# Patient Record
Sex: FEMALE | Race: BLACK OR AFRICAN AMERICAN | NOT HISPANIC OR LATINO
[De-identification: names, ages, dates, MRNs, and addresses within clinical notes are randomized per-mention and may not be internally consistent; named-entity substitution may affect disease eponyms.]

---

## 2019-11-19 PROBLEM — Z00.00 ENCOUNTER FOR PREVENTIVE HEALTH EXAMINATION: Status: ACTIVE | Noted: 2019-11-19

## 2019-11-20 ENCOUNTER — APPOINTMENT (OUTPATIENT)
Dept: ORTHOPEDIC SURGERY | Facility: CLINIC | Age: 54
End: 2019-11-20
Payer: COMMERCIAL

## 2019-11-20 ENCOUNTER — LABORATORY RESULT (OUTPATIENT)
Age: 54
End: 2019-11-20

## 2019-11-20 VITALS
HEIGHT: 64 IN | WEIGHT: 155 LBS | OXYGEN SATURATION: 100 % | SYSTOLIC BLOOD PRESSURE: 150 MMHG | HEART RATE: 62 BPM | DIASTOLIC BLOOD PRESSURE: 83 MMHG | BODY MASS INDEX: 26.46 KG/M2

## 2019-11-20 DIAGNOSIS — M25.462 EFFUSION, LEFT KNEE: ICD-10-CM

## 2019-11-20 DIAGNOSIS — Z87.09 PERSONAL HISTORY OF OTHER DISEASES OF THE RESPIRATORY SYSTEM: ICD-10-CM

## 2019-11-20 DIAGNOSIS — Z78.9 OTHER SPECIFIED HEALTH STATUS: ICD-10-CM

## 2019-11-20 DIAGNOSIS — M25.562 PAIN IN LEFT KNEE: ICD-10-CM

## 2019-11-20 PROCEDURE — 99204 OFFICE O/P NEW MOD 45 MIN: CPT | Mod: 25

## 2019-11-20 PROCEDURE — 73562 X-RAY EXAM OF KNEE 3: CPT | Mod: LT

## 2019-11-20 PROCEDURE — 20610 DRAIN/INJ JOINT/BURSA W/O US: CPT | Mod: LT

## 2019-11-20 NOTE — DISCUSSION/SUMMARY
[de-identified] : Discussed findings of today's exam and possible causes of patient's pain.  Educated patient on their most probable diagnosis of acute left knee pain status post twisting mechanism injury 3 days ago, localized medial joint line tenderness with associated effusion.  Reviewed possible courses of treatment, and we collaboratively decided best course of treatment at this time will include aspiration today (see procedure note).  The aspirated fluid from the knee today was yellow and serous, with lack of bloody effusion doubt acute internal derangement, likely exacerbation of underlying osteoarthritis/chondrosis.  Patient started on a course of oral NSAIDs, prescription given for Mobic (We discussed all possible side effects of this medication).  Patient will be started on a course of physical therapy to restore normal range of motion and strength as tolerated. Patient advised a pickup over-the-counter knee compression sleeve to be worn during the day for support and to prevent refill of fluid. If patient has persisting pain would recommend an MRI to evaluate for internal derangement. If patient has persisting pain and no surgical indications found on MRI would recommend cortisone injection at that time.  Follow up as needed.  Patient appreciates and agrees with current plan.\par \par This note was generated using dragon medical dictation software.  A reasonable effort has been made for proofreading its contents, but typos may still remain.  If there are any questions or points of clarification needed please notify my office.

## 2019-11-20 NOTE — HISTORY OF PRESENT ILLNESS
[de-identified] : Patient is here for left knee pain that began on 11/17/19 when she was walking down stairs. She states that her foot turned and caused her knee to twist. She has used Ibuprofen to treat the pain along with a Azeem's rub. She had a prior injury where she hit her knee with a beach chair in June but had a full recovery. Denies N/T/R/Prior injury/locking/buckling. \par \par The patient's past medical history, past surgical history, medications and allergies were reviewed by me today and documented accordingly. In addition, the patient's family and social history, which were noncontributory to this visit, were reviewed also. The patient has no family history of arthritis.

## 2019-11-20 NOTE — RETURN TO WORK/SCHOOL
[FreeTextEntry1] : Meg was seen today for evaluation of left knee pain, she underwent a procedure in the office today. She is permitted to return to regular work duties on Monday 11/25/19.\par Thank you for your understanding.\par \par Sincerely,\par \par Yong Romero DO, ATC\par Primary Care Sports Medicine\par [facility] [place]\par

## 2019-11-20 NOTE — PHYSICAL EXAM
[de-identified] : Constitutional: Well-nourished, well-developed, No acute distress\par Respiratory:  Good respiratory effort, no SOB\par Lymphatic: No regional lymphadenopathy, no lymphedema\par Psychiatric: Pleasant and normal affect, alert and oriented x3\par Skin: Clean dry and intact B/L UE/LE\par Musculoskeletal: normal except where as noted in regional exam\par \par \par Right Knee:\par APPEARANCE: no marked deformities, no swelling or malalignment\par POSITIVE TENDERNESS:  none\par NONTENDER: jt lines b/l & retinacula b/l, patellar & quadriceps tendons, MCL/LCL, ITB at the lateral femoral condyle & Gerdy's tubercle, pes bursa. \par ROM: full & painless. \par RESISTIVE TESTING: painless resisted knee flex/ext. \par SPECIAL TESTS: stable v/v stress. painless grind. neg Lachman's. neg ant/post drawer. neg Radha's. neg Thessaly test. neg Lillian's & Malacrae's\par NEURO: Normal sensation of LE, DTRs 2+/4 patella and achilles\par PULSES: 2+ DP/PT pulses\par B/L Hips: No asymmetry, malalignment, or swelling, Full ROM, 5/5 strength in flexion/ext, IR/ER, Abd/Add, Joints stable\par B/L Ankles: No asymmetry, malalignment, or swelling, Full ROM, 5/5 strength in DF/PF/Inv/Ev, Joints stable\par BIOMECHANICAL EXAM: no marked leg length discrepancy, mild hip abductor weakness b/l, no marked foot pronation, tight hams and ITB b/l.  Normal gait and station\par \par Left Knee:\par APPEARANCE: 2+ swelling, no marked deformities or malalignment\par POSITIVE TENDERNESS:  tender medial joint line. \par NONTENDER: lateral jt line & retinacula b/l, patellar & quadriceps tendons, MCL/LCL, ITB at the lateral femoral condyle & Gerdy's tubercle, pes bursa. \par ROM: mild pain & restriction in flexion. \par RESISTIVE TESTING: painless resisted knee flex/ext. \par SPECIAL TESTS: Radha's reproduces pain in the medial joint line. Thessaly test reproduces pain in the medial joint line. stable v/v stress. painless grind. neg Lachman's. neg ant/post drawer. neg Lillian's & Malacrae's\par NEURO: Normal sensation of LE, DTRs 2+/4 patella and achilles\par PULSES: 2+ DP/PT pulses\par \par \par  [de-identified] : The following radiographs were ordered and read by me during this patient's visit. I reviewed each radiograph in detail with the patient and discussed the findings as highlighted below. \par \par 3 views of the left knee were obtained today that show degenerative changes and evidence of mild tricompartmental osteoarthritis with medial joint line narrowing.  No fracture, or dislocation seen at this time. There is no malalignment. No other obvious osseous abnormality. Otherwise unremarkable.\par \par

## 2019-11-20 NOTE — PROCEDURE
[de-identified] : Aspiration: left knee joint.\par Indication: Effusion.\par \par A discussion was had with the patient regarding this procedure and all questions were answered. All risks, benefits and alternatives were discussed. These included but were not limited to bleeding, infection, allergic reaction and reaccumulation of fluid. A timeout was done to ensure correct side and pt agreed to the procedure. Betadine was used to sterilize and prep the area, and alcohol was used to clean the skin in the supero-lateral aspect of the knee. Ethyl chloride spray was then used as a topical anesthetic. An 18-gauge needle was used to aspirate the knee joint from the superolateral approach and approximately 37cc of serosanguineous fluid was aspirated from the knee without complication. A sterile bandage was then applied. The patient tolerated the procedure well.\par

## 2019-11-25 LAB
B PERT IGG+IGM PNL SER: ABNORMAL
COLOR FLD: YELLOW
EOSINOPHIL # FLD MANUAL: 0 %
FLUID INTAKE SUBSTANCE CLASS: NORMAL
LYMPHOCYTES # FLD MANUAL: 1 %
MESOTHL CELL NFR FLD: 0 %
MONOS+MACROS NFR FLD MANUAL: 35 %
NEUTS SEG # FLD MANUAL: 64 %
NRBC # FLD: 0
RBC # FLD MANUAL: 1000 /UL
SYCRY CLARITY: ABNORMAL
SYCRY COLOR: YELLOW
SYCRY ID: NORMAL
SYCRY TUBE: NORMAL
TOTAL CELLS COUNTED FLD: 143 /UL
TUBE TYPE: NORMAL
UNIDENT CELLS NFR FLD MANUAL: 0 %
VARIANT LYMPHS # FLD MANUAL: 0 %

## 2019-12-09 LAB — BACTERIA FLD CULT: NORMAL

## 2019-12-16 ENCOUNTER — RX RENEWAL (OUTPATIENT)
Age: 54
End: 2019-12-16

## 2020-01-13 ENCOUNTER — RX RENEWAL (OUTPATIENT)
Age: 55
End: 2020-01-13

## 2020-01-13 RX ORDER — MELOXICAM 7.5 MG/1
7.5 TABLET ORAL
Qty: 30 | Refills: 0 | Status: ACTIVE | COMMUNITY
Start: 2019-11-20 | End: 1900-01-01

## 2020-02-14 ENCOUNTER — RX RENEWAL (OUTPATIENT)
Age: 55
End: 2020-02-14

## 2021-10-30 ENCOUNTER — EMERGENCY (EMERGENCY)
Facility: HOSPITAL | Age: 56
LOS: 1 days | Discharge: HOME | End: 2021-10-30
Attending: STUDENT IN AN ORGANIZED HEALTH CARE EDUCATION/TRAINING PROGRAM
Payer: SELF-PAY

## 2021-10-30 VITALS
OXYGEN SATURATION: 100 % | HEIGHT: 64 IN | WEIGHT: 164.02 LBS | TEMPERATURE: 99 F | HEART RATE: 64 BPM | SYSTOLIC BLOOD PRESSURE: 173 MMHG | RESPIRATION RATE: 18 BRPM | DIASTOLIC BLOOD PRESSURE: 92 MMHG

## 2021-10-30 PROCEDURE — 99283 EMERGENCY DEPT VISIT LOW MDM: CPT

## 2021-10-30 PROCEDURE — 99282 EMERGENCY DEPT VISIT SF MDM: CPT

## 2021-10-30 NOTE — ED STATDOCS - CLINICAL SUMMARY MEDICAL DECISION MAKING FREE TEXT BOX
Ring removed with lube and silk suture.  FROMI throughout digit s/p removal  DNVI  NSAIDs and RICE discussed with pt  Return precautions

## 2021-10-30 NOTE — ED STATDOCS - OBJECTIVE STATEMENT
56 F presents to ed c/o ring stuck on R 3rd digit. Put it on last night, woke up this morning with some distal swelling, unable to remove. States she hasn't worn this ring in years. Denies any other complaints.

## 2021-10-30 NOTE — ED STATDOCS - PATIENT PORTAL LINK FT
You can access the FollowMyHealth Patient Portal offered by [facility] by registering at the following website: [email or web address]. By joining [facility]’s FollowMyHealth portal, you will also be able to view your health information using other applications (apps) compatible with our system.

## 2021-10-30 NOTE — ED STATDOCS - ATTENDING CONTRIBUTION TO CARE
57yo female with ring stuck to the right 3rd digit. Pt denies fevers/chills, ha, loc, focal neuro deficits, cp/sob/palp, cough, abd pain/n/v/d, urinary symptoms, recent travel and sick contacts.  ring stuck on R 3rd digit. Mild distal swelling. No erythema. No pain with ROM.  ring removal

## 2022-07-05 ENCOUNTER — APPOINTMENT (OUTPATIENT)
Dept: ORTHOPEDIC SURGERY | Facility: CLINIC | Age: 57
End: 2022-07-05

## 2022-07-05 DIAGNOSIS — M17.11 UNILATERAL PRIMARY OSTEOARTHRITIS, RIGHT KNEE: ICD-10-CM

## 2022-07-05 PROCEDURE — 99214 OFFICE O/P EST MOD 30 MIN: CPT

## 2022-07-05 RX ORDER — MELOXICAM 7.5 MG/1
7.5 TABLET ORAL
Qty: 30 | Refills: 0 | Status: ACTIVE | COMMUNITY
Start: 2022-07-05 | End: 1900-01-01

## 2022-07-05 RX ORDER — MELOXICAM 15 MG/1
15 TABLET ORAL
Qty: 30 | Refills: 0 | Status: ACTIVE | COMMUNITY
Start: 2022-06-17

## 2022-07-05 NOTE — HISTORY OF PRESENT ILLNESS
[de-identified] : Patient is here for right knee pain that began about 2 months ago without inciting injury. The pain is intermittent and she i unable ot identify any triggers. She saw a different provider who sent her for an MRI and recommended surgery. She has done nothing to treat it during this time. Denies N/T/R/Prior injury. She works as a . She has continued to exercise during this time.

## 2022-07-05 NOTE — DISCUSSION/SUMMARY
[de-identified] : Discussed findings of today's exam and possible causes of patient's pain.  Educated patient on their most probable diagnosis of chronic intermittent right knee pain with recent exacerbation after performing [place] exercises.  Reviewed possible courses of treatment, and we collaboratively decided best course of treatment at this time will include conservative management.  Patient was seen by an outside orthopedic group, she already had MRI of her right knee which shows that she has degenerative radial tear of the posterior horn of the medial meniscus.  Patient was advised by that orthopedic surgeon that she should have surgery, patient is looking for a second opinion, she is hoping to avoid surgery if at all possible.  I reviewed the patient's prior MRI of the left knee from 2018, patient had a radial tear of the posterior horn of the medial meniscus on that MRI of the left knee at that time.  She was able to proceed with nonsurgical management and had resolution of her pain.  She still has intermittent left knee pain, but it does not limit her from doing any of her activities.  Patient is advised that she has no significant effusion right now, it is unlikely that this radial tear is acute in nature, this is likely a chronic finding and became exacerbated.  I advised the patient it seems beneficial to proceed with a course of nonsurgical treatment to see if we can alleviate this pain, if not it does not eliminate potential arthroscopic surgical intervention as a future treatment option.  Patient started on a course of oral NSAIDs, prescription given for Mobic (We discussed all possible side effects of this medication).  Patient will be started on a course of physical therapy to restore normal range of motion and strength as tolerated.  We discussed the role of a cortisone injection for short-term pain relief, patient would like to defer at this time.  Recommend follow-up in 1 month if patient is still having persisting pain.  Patient appreciates and agrees with current plan.\par \par I work as part of an academic orthopedic group and routinely have a physician in training (resident / fellow) working with me.  Any part of the history and physical exam performed by the physician in training was either directly reviewed and/or replicated by myself.  Any procedure performed by the physician in training was performed under my direct supervision and with the consent of the patient.\par \par This note was generated using dragon medical dictation software.  A reasonable effort has been made for proofreading its contents, but typos may still remain.  If there are any questions or points of clarification needed please notify my office.

## 2022-07-05 NOTE — RETURN TO WORK/SCHOOL
[FreeTextEntry1] : Meg was seen in the office today for evaluation and management of right knee orthopedic issue.\par Thank you for your understanding.\par \par Sincerely,\par \par Yong Romero DO, ATC\par Primary Care Sports Medicine\par [facility] [place]\par

## 2022-07-05 NOTE — PHYSICAL EXAM
[de-identified] : Constitutional: Well-nourished, well-developed, No acute distress\par Respiratory:  Good respiratory effort, no SOB\par Psychiatric: Pleasant and normal affect, alert and oriented x3\par Musculoskeletal: normal except where as noted in regional exam\par \par Right Knee:\par APPEARANCE: mild swelling, no marked deformities or malalignment\par POSITIVE TENDERNESS:  tender medial joint line. \par NONTENDER: lateral jt line & retinacula b/l, patellar & quadriceps tendons, MCL/LCL, ITB at the lateral femoral condyle & Gerdy's tubercle, pes bursa. \par ROM: mild pain & restriction in flexion. \par RESISTIVE TESTING: painless resisted knee flex/ext. \par SPECIAL TESTS: Radha's reproduces pain in the medial joint line. Thessaly test reproduces pain in the medial joint line. stable v/v stress. painless grind. neg Lachman's. neg ant/post drawer.\par  [de-identified] : I reviewed, interpreted and clinically correlated the following outside imaging studies,\par \par DATE OF EXAM: 04/29/2022\par EXAM: MRI-RIGHT KNEE NON CONTRAST\par HISTORY: M25.561 Right knee pain\par TECHNIQUE: Axial, coronal, and sagittal images of the right knee were obtained\par on a 3.0 Jennifer magnet.\par COMPARISON: None.\par FINDINGS:\par LIGAMENTS\par Anterior cruciate ligament: Intact.\par Posterior cruciate ligament: Intact.\par Medial collateral ligament: Intact.\par Lateral supporting structures: The lateral collateral ligament is intact. The\par biceps femoris tendon is intact. The iliotibial band is intact. The popliteus\par tendon is intact.\par Posterolateral corner structures: Intact.\par MENISCI\par Medial meniscus: Full-thickness radial tear at the posterior root ligament\par attachment. Fragmented corticated bony bodies at the posterior joint space\par superficial to the posterior cruciate ligament attachment may represent sequelae\par of prior trauma with internal high signal consistent with edema.\par Lateral meniscus: Intact.\par EXTENSOR MECHANISM\par Distal quadriceps: Intact.\par Patellar tendon: Intact.\par Patella: No subluxation or tilt.\par Medial Patellofemoral Ligament/Retinaculum: Intact.\par OSSEOUS AND ARTICULAR STRUCTURES\par Bones: No acute fracture or focal bony lesion. Intra-articular bony bodies as\par above with subchondral cystic changes involving the subjacent posterolateral\par medial tibial plateau.\par Patellofemoral compartment: Full-thickness cartilage loss involving the lateral\par trochlear groove with osteophyte formation and subchondral marrow edema.\par Medial compartment: No hyaline cartilage disease.\par Lateral compartment: High-grade partial thickness cartilage loss involving the\par anterior femoral condyle weightbearing cartilage. Tibial cartilage maintained.\par Page 2 of 2\par OTHER\par Fluid: Small joint effusion. Small popliteal cyst.\par Soft Tissues: No soft tissue mass or edema. Lobulated fluid signal posterior to\par the posteromedial joint capsule (series 5 image 22) compatible with a ganglion\par cyst that measures 16 mm in greatest CC dimension. Neurovascular structures\par demonstrate normal course.\par IMPRESSION:\par Radial tear at the posterior root ligament attachment of the medial meniscus.\par Full-thickness involving the lateral trochlear groove with osteophyte formation\par and subchondral marrow edema.\par High-grade partial thickness cartilage loss involving the anterior lateral\par femoral condyle.\par Corticated intra-articular bony bodies at the posterior joint space may\par represent sequelae of prior trauma versus calcified displaced cartilage\par fragment.

## 2022-07-21 NOTE — ED PROCEDURE NOTE - NS ED PROCEDURE ASSISTED BY
The procedure was performed independently Sski Pregnancy And Lactation Text: This medication is Pregnancy Category D and isn't considered safe during pregnancy. It is excreted in breast milk.

## 2025-03-10 ENCOUNTER — NON-APPOINTMENT (OUTPATIENT)
Age: 60
End: 2025-03-10

## 2025-03-10 ENCOUNTER — EMERGENCY (EMERGENCY)
Facility: HOSPITAL | Age: 60
LOS: 1 days | Discharge: HOME | End: 2025-03-10
Attending: EMERGENCY MEDICINE
Payer: COMMERCIAL

## 2025-03-10 VITALS
OXYGEN SATURATION: 98 % | TEMPERATURE: 98 F | SYSTOLIC BLOOD PRESSURE: 160 MMHG | DIASTOLIC BLOOD PRESSURE: 83 MMHG | WEIGHT: 139.99 LBS | HEART RATE: 87 BPM | RESPIRATION RATE: 16 BRPM

## 2025-03-10 PROCEDURE — 99285 EMERGENCY DEPT VISIT HI MDM: CPT

## 2025-03-10 RX ORDER — VANCOMYCIN HCL IN 5 % DEXTROSE 1.5G/250ML
1000 PLASTIC BAG, INJECTION (ML) INTRAVENOUS ONCE
Refills: 0 | Status: COMPLETED | OUTPATIENT
Start: 2025-03-10 | End: 2025-03-10

## 2025-03-10 RX ORDER — PIPERACILLIN-TAZO-DEXTROSE,ISO 3.375G/5
3.38 IV SOLUTION, PIGGYBACK PREMIX FROZEN(ML) INTRAVENOUS ONCE
Refills: 0 | Status: COMPLETED | OUTPATIENT
Start: 2025-03-10 | End: 2025-03-10

## 2025-03-10 RX ORDER — ACETAMINOPHEN 500 MG/5ML
1000 LIQUID (ML) ORAL ONCE
Refills: 0 | Status: COMPLETED | OUTPATIENT
Start: 2025-03-10 | End: 2025-03-10

## 2025-03-10 NOTE — ED PROVIDER NOTE - PROGRESS NOTE DETAILS
Sadia Rodriguez, DO: patient received at sign out pending surgery recs. patient evaluated by surgery, recs appreciated. rx abx to pharmacy and patient will f/u outpatient. stable for dc home.

## 2025-03-10 NOTE — ED PROVIDER NOTE - NSFOLLOWUPCLINICS_GEN_ALL_ED_FT
[facility] Acute Care Surgery  Acute Care Surgery  [address] [place] [id number]  Phone: [phone]  Fax:

## 2025-03-10 NOTE — ED ADULT NURSE NOTE - NS ED NURSE RECORD ANOTHER HT AND WT
The eustachian tube is a small canal that connects the middle ear to the back of the nose and upper throat. It serves as a mechanism to equalize air pressure in the middle ear with outside pressure. Eustachian tube dysfunction (ETD) occurs when the tube fails to open (stuck closed) during swallowing or yawning resulting in a difference between the air pressure inside and outside the middle ear. This condition, mostly commonly seen in young children, can cause ear pain and sometimes difficulty hearing.    CAUSES OF EUSTACHIAN TUBE DYSFUNCTION  The most common cause is an infection of the nose such as a cold or sinusitis  Enlarged adenoids and tonsils, especially in children  Allergies  Smoking and pollution  Nose polyps or nasal tumors  Cleft palate    SYMPTOMS OF EUSTACHIAN TUBE DYSFUNCTION  Pulling or tugging on the ear (for young children especially)  Discomfort or pain in the ear  Ears feel full or clogged  Ringing or popping noises in the ears  Hearing loss  Dizziness or a sensation of spinning known as vertigo  Symptoms that cannot be relieved by swallowing, yawning, or chewing    MEDICAL TREATMENT FOR EUSTACHIAN TUBE DYSFUNCTION  Nasal or oral decongestants  Oral antihistamines  Nasal steroids to relieve nasal congestion and enable the eustachian tube to open  Pain medications such as acetaminophen or ibuprofen    Yes

## 2025-03-10 NOTE — ED ADULT NURSE NOTE - NSFALLUNIVINTERV_ED_ALL_ED
no Bed/Stretcher in lowest position, wheels locked, appropriate side rails in place/Call bell, personal items and telephone in reach/Instruct patient to call for assistance before getting out of bed/chair/stretcher/Non-slip footwear applied when patient is off stretcher/[place] to call system/Physically safe environment - no spills, clutter or unnecessary equipment/Purposeful proactive rounding/Room/bathroom lighting operational, light cord in reach

## 2025-03-10 NOTE — ED PROVIDER NOTE - NSFOLLOWUPINSTRUCTIONS_ED_ALL_ED_FT
- Take your antibiotics as prescribed  - Follow up with surgery outpatient  - FOR PAIN: You may take Tylenol 1000mg every 6 hours or Ibuprofen 600mg every 6 hours as needed. It is safe to combine these medications should you need to take both.   - Cover prior drain site daily with gauze and Tegaderm       Feel better and good luck!

## 2025-03-10 NOTE — ED ADULT TRIAGE NOTE - CHIEF COMPLAINT QUOTE
Pt. p/w c/o R sided abdominal pain. S/p tummy tuck and BBL 2/10 done in . Then had revision in DR on 2/26. States pain is around surgical and drain site. Endorsing chills. Denies fevers.

## 2025-03-10 NOTE — ED PROVIDER NOTE - CLINICAL SUMMARY MEDICAL DECISION MAKING FREE TEXT BOX
HPI: 60-year-old female with recent abdominoplasty done with BBL and breast augmentation on 2/10 and subsequent revision on 2/26 due to reported intra-abdominal hemorrhage, all conducted in the DR presents complaining of diffuse abdominal pain and drainage from the right abdominal wall drain.  Patient states she has not had her dressings changed for the last week as she has no one to changes in for.  Endorsing chills but denies fevers at home. Patient states she has been taking Euroclin for the last 3 days.  No other current complaints at this time.    ROS:   General: No fever, no chills, no malaise, no fatigue  Respiratory: No cough, no dyspnea, no pleuritic chest pain  Cardiac: no chest pain, no palpitations, no edema, no jvd  Abdomen: + abdominal pain, no nausea, no vomiting, no diarrhea  : No dysuria, no increase frequency, no urgency, No discharge  Musculoskeletal: No myalgia, no arthralgia  Neurologic: No headache, no vertigo, no paresthesia, no focal deficits  Skin: see hpi  All other ROS are negative    PE:  General: NAD; A&O x3   Head: NC/AT  Eyes: PERRL, EOMI  ENT: Airway patent, mmm  Pulmonary: CTA b/l, symmetric breath sounds. No W/R/R.  Cardiac: s1s2, RRR, no M,G,R, No JVD  Abdomen: +BS, ND, diffuse TTP, soft, no guarding, no rebound, no masses , no rigidity  Back: Normal  spine  Extremities: FROM, symmetric pulses  Skin: Chaperone RN Sofiya, lower abdominal surgical scar CDI, right lower quadrant abdominal wall drain with purulent drainage and trace erythema surrounding, breast augmentation scarring CDI  Neurologic: alert, speech clear, no focal deficits    MDM: 60-year-old female with recent abdominoplasty done with BBL and breast augmentation on 2/10 and subsequent revision on 2/26 due to reported intra-abdominal hemorrhage, all conducted in the DR presents complaining of diffuse abdominal pain and drainage from the right abdominal wall drain.  Possible intra-abdominal infection versus cellulitis versus abscess due to retained surgical drain.  Will obtain CBC, CMP, BC, CTAP.  Will give prophylactic IV fluids and antibiotics.  Will provide symptomatic control as needed.  Consider discussing case with surgery and disposition pending results.

## 2025-03-10 NOTE — ED PROVIDER NOTE - PATIENT PORTAL LINK FT
You can access the FollowMyHealth Patient Portal offered by  by registering at the following website: [email or web address]. By joining [facility]’s FollowMyHealth portal, you will also be able to view your health information using other applications (apps) compatible with our system.

## 2025-03-10 NOTE — ED ADULT NURSE NOTE - OBJECTIVE STATEMENT
presents to ED c/o post op complication. in [facility] [place],  recent abdominoplasty done with BBL and breast augmentation on 2/10 and subsequent revision on 2/26 due to reported intra-abdominal hemorrhage, c/o pain to entire abd and drain site with chills no fevers. +purulent drainage from drain site. states she has not changed the dressings since last monday.

## 2025-03-11 VITALS
HEART RATE: 68 BPM | TEMPERATURE: 99 F | DIASTOLIC BLOOD PRESSURE: 59 MMHG | OXYGEN SATURATION: 97 % | SYSTOLIC BLOOD PRESSURE: 117 MMHG | RESPIRATION RATE: 18 BRPM

## 2025-03-11 LAB
ALBUMIN SERPL ELPH-MCNC: 3.7 G/DL — SIGNIFICANT CHANGE UP (ref 3.3–5.2)
ALP SERPL-CCNC: 72 U/L — SIGNIFICANT CHANGE UP (ref 40–120)
ALT FLD-CCNC: 22 U/L — SIGNIFICANT CHANGE UP
ANION GAP SERPL CALC-SCNC: 11 MMOL/L — SIGNIFICANT CHANGE UP (ref 5–17)
APTT BLD: 26 SEC — SIGNIFICANT CHANGE UP (ref 24.5–35.6)
AST SERPL-CCNC: 26 U/L — SIGNIFICANT CHANGE UP
BASOPHILS # BLD AUTO: 0.07 K/UL — SIGNIFICANT CHANGE UP (ref 0–0.2)
BASOPHILS NFR BLD AUTO: 1 % — SIGNIFICANT CHANGE UP (ref 0–2)
BILIRUB SERPL-MCNC: 0.2 MG/DL — LOW (ref 0.4–2)
BUN SERPL-MCNC: 8.9 MG/DL — SIGNIFICANT CHANGE UP (ref 8–20)
CALCIUM SERPL-MCNC: 9.6 MG/DL — SIGNIFICANT CHANGE UP (ref 8.4–10.5)
CHLORIDE SERPL-SCNC: 100 MMOL/L — SIGNIFICANT CHANGE UP (ref 96–108)
CO2 SERPL-SCNC: 28 MMOL/L — SIGNIFICANT CHANGE UP (ref 22–29)
CREAT SERPL-MCNC: 0.76 MG/DL — SIGNIFICANT CHANGE UP (ref 0.5–1.3)
EGFR: 89 ML/MIN/1.73M2 — SIGNIFICANT CHANGE UP
EGFR: 89 ML/MIN/1.73M2 — SIGNIFICANT CHANGE UP
EOSINOPHIL # BLD AUTO: 0.39 K/UL — SIGNIFICANT CHANGE UP (ref 0–0.5)
EOSINOPHIL NFR BLD AUTO: 5.3 % — SIGNIFICANT CHANGE UP (ref 0–6)
GLUCOSE SERPL-MCNC: 86 MG/DL — SIGNIFICANT CHANGE UP (ref 70–99)
HCT VFR BLD CALC: 32.1 % — LOW (ref 34.5–45)
HGB BLD-MCNC: 9.6 G/DL — LOW (ref 11.5–15.5)
IMM GRANULOCYTES # BLD AUTO: 0.02 K/UL — SIGNIFICANT CHANGE UP (ref 0–0.07)
IMM GRANULOCYTES NFR BLD AUTO: 0.3 % — SIGNIFICANT CHANGE UP (ref 0–0.9)
INR BLD: 1.03 RATIO — SIGNIFICANT CHANGE UP (ref 0.85–1.16)
LACTATE SERPL-SCNC: 1.1 MMOL/L — SIGNIFICANT CHANGE UP (ref 0.5–2)
LYMPHOCYTES # BLD AUTO: 2.87 K/UL — SIGNIFICANT CHANGE UP (ref 1–3.3)
LYMPHOCYTES NFR BLD AUTO: 39 % — SIGNIFICANT CHANGE UP (ref 13–44)
MCHC RBC-ENTMCNC: 23.9 PG — LOW (ref 27–34)
MCHC RBC-ENTMCNC: 29.9 G/DL — LOW (ref 32–36)
MCV RBC AUTO: 80 FL — SIGNIFICANT CHANGE UP (ref 80–100)
MONOCYTES # BLD AUTO: 0.61 K/UL — SIGNIFICANT CHANGE UP (ref 0–0.9)
MONOCYTES NFR BLD AUTO: 8.3 % — SIGNIFICANT CHANGE UP (ref 2–14)
NEUTROPHILS # BLD AUTO: 3.4 K/UL — SIGNIFICANT CHANGE UP (ref 1.8–7.4)
NEUTROPHILS NFR BLD AUTO: 46.1 % — SIGNIFICANT CHANGE UP (ref 43–77)
NRBC # BLD AUTO: 0 K/UL — SIGNIFICANT CHANGE UP (ref 0–0)
NRBC # FLD: 0 K/UL — SIGNIFICANT CHANGE UP (ref 0–0)
NRBC BLD AUTO-RTO: 0 /100 WBCS — SIGNIFICANT CHANGE UP (ref 0–0)
PLATELET # BLD AUTO: 335 K/UL — SIGNIFICANT CHANGE UP (ref 150–400)
PMV BLD: 10.9 FL — SIGNIFICANT CHANGE UP (ref 7–13)
POTASSIUM SERPL-MCNC: 3.9 MMOL/L — SIGNIFICANT CHANGE UP (ref 3.5–5.3)
POTASSIUM SERPL-SCNC: 3.9 MMOL/L — SIGNIFICANT CHANGE UP (ref 3.5–5.3)
PROT SERPL-MCNC: 6.5 G/DL — LOW (ref 6.6–8.7)
PROTHROM AB SERPL-ACNC: 11.9 SEC — SIGNIFICANT CHANGE UP (ref 9.9–13.4)
RBC # BLD: 4.01 M/UL — SIGNIFICANT CHANGE UP (ref 3.8–5.2)
RBC # FLD: 17.9 % — HIGH (ref 10.3–14.5)
SODIUM SERPL-SCNC: 139 MMOL/L — SIGNIFICANT CHANGE UP (ref 135–145)
WBC # BLD: 7.36 K/UL — SIGNIFICANT CHANGE UP (ref 3.8–10.5)
WBC # FLD AUTO: 7.36 K/UL — SIGNIFICANT CHANGE UP (ref 3.8–10.5)

## 2025-03-11 PROCEDURE — 85025 COMPLETE CBC W/AUTO DIFF WBC: CPT

## 2025-03-11 PROCEDURE — 96374 THER/PROPH/DIAG INJ IV PUSH: CPT

## 2025-03-11 PROCEDURE — 93010 ELECTROCARDIOGRAM REPORT: CPT

## 2025-03-11 PROCEDURE — 96375 TX/PRO/DX INJ NEW DRUG ADDON: CPT

## 2025-03-11 PROCEDURE — 85730 THROMBOPLASTIN TIME PARTIAL: CPT

## 2025-03-11 PROCEDURE — 99285 EMERGENCY DEPT VISIT HI MDM: CPT | Mod: 25

## 2025-03-11 PROCEDURE — 74177 CT ABD & PELVIS W/CONTRAST: CPT | Mod: 26

## 2025-03-11 PROCEDURE — 93005 ELECTROCARDIOGRAM TRACING: CPT

## 2025-03-11 PROCEDURE — 85610 PROTHROMBIN TIME: CPT

## 2025-03-11 PROCEDURE — 83605 ASSAY OF LACTIC ACID: CPT

## 2025-03-11 PROCEDURE — 36415 COLL VENOUS BLD VENIPUNCTURE: CPT

## 2025-03-11 PROCEDURE — 71260 CT THORAX DX C+: CPT | Mod: 26

## 2025-03-11 PROCEDURE — 74177 CT ABD & PELVIS W/CONTRAST: CPT | Mod: MC

## 2025-03-11 PROCEDURE — 87040 BLOOD CULTURE FOR BACTERIA: CPT

## 2025-03-11 PROCEDURE — 71260 CT THORAX DX C+: CPT | Mod: MC

## 2025-03-11 PROCEDURE — 96361 HYDRATE IV INFUSION ADD-ON: CPT

## 2025-03-11 PROCEDURE — 80053 COMPREHEN METABOLIC PANEL: CPT

## 2025-03-11 PROCEDURE — 96376 TX/PRO/DX INJ SAME DRUG ADON: CPT

## 2025-03-11 RX ORDER — CLINDAMYCIN PHOSPHATE 150 MG/ML
3 VIAL (ML) INJECTION
Qty: 63 | Refills: 0
Start: 2025-03-11 | End: 2025-03-17

## 2025-03-11 RX ORDER — ACETAMINOPHEN 500 MG/5ML
1000 LIQUID (ML) ORAL ONCE
Refills: 0 | Status: COMPLETED | OUTPATIENT
Start: 2025-03-11 | End: 2025-03-11

## 2025-03-11 RX ADMIN — Medication 400 MILLIGRAM(S): at 00:23

## 2025-03-11 RX ADMIN — Medication 200 GRAM(S): at 00:23

## 2025-03-11 RX ADMIN — Medication 1950 MILLILITER(S): at 01:30

## 2025-03-11 RX ADMIN — Medication 1950 MILLILITER(S): at 00:23

## 2025-03-11 RX ADMIN — Medication 400 MILLIGRAM(S): at 08:50

## 2025-03-11 RX ADMIN — Medication 250 MILLIGRAM(S): at 01:40

## 2025-03-11 NOTE — CONSULT NOTE ADULT - SUBJECTIVE AND OBJECTIVE BOX
HPI: 60F presents to ED with chief complaint of...      PAST MEDICAL & SURGICAL HISTORY:    abdominoplasty, breast augmentation and BBL 2/10/25  abdominoplasty, breast augmentation and BBL revision 2/26/25    No Known Allergies      T(C): 37.2 (03-11-25 @ 07:51), Max: 37.2 (03-11-25 @ 07:51)  HR: 78 (03-11-25 @ 07:51) (72 - 87)  BP: 111/69 (03-11-25 @ 07:51) (111/69 - 160/83)  RR: 18 (03-11-25 @ 07:51) (16 - 18)  SpO2: 98% (03-11-25 @ 07:51) (98% - 100%)                              9.6    7.36  )-----------( 335      ( 10 Mar 2025 23:45 )             32.1       03-10    139  |  100  |  8.9  ----------------------------<  86  3.9   |  28.0  |  0.76    Ca    9.6      10 Mar 2025 23:45    TPro  6.5[L]  /  Alb  3.7  /  TBili  0.2[L]  /  DBili  x   /  AST  26  /  ALT  22  /  AlkPhos  72  03-10      Urinalysis Basic - ( 10 Mar 2025 23:45 )    Color: x / Appearance: x / SG: x / pH: x  Gluc: 86 mg/dL / Ketone: x  / Bili: x / Urobili: x   Blood: x / Protein: x / Nitrite: x   Leuk Esterase: x / RBC: x / WBC x   Sq Epi: x / Non Sq Epi: x / Bacteria: x            Radiology:     HPI: 60F presents to ED with chief complaint of abdominal pain x 1 day. Pt is s/p breast augmentation, abdominoplasty and b/l breast augmentation in [place] on 2/10/25 with return to OR for revision of abdominoplasty on 2/26/25 d/t concern of hemorrhage. Patient states that she developed abdominal pain and noticed pus draining from abdominal incision     PAST MEDICAL & SURGICAL HISTORY:    abdominoplasty, breast augmentation and BBL 2/10/25  abdominoplasty, breast augmentation and BBL revision 2/26/25    No Known Allergies      T(C): 37.2 (03-11-25 @ 07:51), Max: 37.2 (03-11-25 @ 07:51)  HR: 78 (03-11-25 @ 07:51) (72 - 87)  BP: 111/69 (03-11-25 @ 07:51) (111/69 - 160/83)  RR: 18 (03-11-25 @ 07:51) (16 - 18)  SpO2: 98% (03-11-25 @ 07:51) (98% - 100%)                              9.6    7.36  )-----------( 335      ( 10 Mar 2025 23:45 )             32.1       03-10    139  |  100  |  8.9  ----------------------------<  86  3.9   |  28.0  |  0.76    Ca    9.6      10 Mar 2025 23:45    TPro  6.5[L]  /  Alb  3.7  /  TBili  0.2[L]  /  DBili  x   /  AST  26  /  ALT  22  /  AlkPhos  72  03-10      Urinalysis Basic - ( 10 Mar 2025 23:45 )    Color: x / Appearance: x / SG: x / pH: x  Gluc: 86 mg/dL / Ketone: x  / Bili: x / Urobili: x   Blood: x / Protein: x / Nitrite: x   Leuk Esterase: x / RBC: x / WBC x   Sq Epi: x / Non Sq Epi: x / Bacteria: x            Radiology:     HPI: 60F presents to ED with chief complaint of abdominal pain x 1 day. Pt is s/p breast augmentation, abdominoplasty and b/l breast augmentation in [facility] on 2/10/25 with return to OR for revision of abdominoplasty on 2/26/25 d/t concern of hemorrhage. Patient states that she developed abdominal pain suddenly and noticed pus draining from abdominal incision on L side yesterday. She also c/o subjective fever and chills. Patient has had L abdominal wall accordion drain in place x 3 weeks and has not noticed change in drain output, however, has had worsened pain surrounding drain site. Pt was instructed by surgeon to remove drain on her own tomorrow (3/12/25). She has also been taking clindamycin as directed. CT C/A/P shows post-operative changes with ill-defined abscess superficial to abdominal wall less than 1cm in AP dimension.     PAST MEDICAL & SURGICAL HISTORY:     abdominoplasty, breast augmentation and BBL 2/10/25  abdominoplasty, breast augmentation and BBL revision 2/26/25    No Known Allergies      T(C): 37.2 (03-11-25 @ 07:51), Max: 37.2 (03-11-25 @ 07:51)  HR: 78 (03-11-25 @ 07:51) (72 - 87)  BP: 111/69 (03-11-25 @ 07:51) (111/69 - 160/83)  RR: 18 (03-11-25 @ 07:51) (16 - 18)  SpO2: 98% (03-11-25 @ 07:51) (98% - 100%)                              9.6    7.36  )-----------( 335      ( 10 Mar 2025 23:45 )             32.1       03-10    139  |  100  |  8.9  ----------------------------<  86  3.9   |  28.0  |  0.76    Ca    9.6      10 Mar 2025 23:45    TPro  6.5[L]  /  Alb  3.7  /  TBili  0.2[L]  /  DBili  x   /  AST  26  /  ALT  22  /  AlkPhos  72  03-10      Urinalysis Basic - ( 10 Mar 2025 23:45 )    Color: x / Appearance: x / SG: x / pH: x  Gluc: 86 mg/dL / Ketone: x  / Bili: x / Urobili: x   Blood: x / Protein: x / Nitrite: x   Leuk Esterase: x / RBC: x / WBC x   Sq Epi: x / Non Sq Epi: x / Bacteria: x            Radiology:  < from: CT Chest w/ IV Cont (03.11.25 @ 02:08) >  IMPRESSION:  Postoperative change from abdominoplasty with prominent subcutaneous   edema andill-defined abscess superficial to the abdominal wall which   measures less than 1 cm in AP dimension. Abdominal wall drainage catheter   tip terminates approximately 1.3 cm inferiorly to the collection.    No acute findings in the chest.    < end of copied text >

## 2025-03-11 NOTE — CONSULT NOTE ADULT - ASSESSMENT
Assessment: 60F POD#13 s/p revision of abdominoplasty, with h/o abdominoplasty, breast augmentation and BBL in DR on 2/10 presents with new onset abdominal pain. CT shows <1 cm collection superficial to abdominal wall. Hemodynamically stable and afebrile. Labs unremarkable for lactate or leukocytosis. Drain with serous output.      Plan:  **pending**   - continue abx   - MM pain control prn   - wound care Assessment: 60F POD#13 s/p revision of abdominoplasty, with h/o abdominoplasty, breast augmentation and BBL in DR on 2/10 presents with new onset abdominal pain. CT shows <1 cm collection superficial to abdominal wall. Hemodynamically stable and afebrile. Labs unremarkable for lactate or leukocytosis. Drain with serous output.      Plan:    - patient does not require drainage or additional surgical intervention at this time   - will d/c drain at bedside today   - d/c home on po abx x 1 week  - recommend patient follow up with established surgeon outpatient  - MM pain control prn   - cover prior drain site daily with gauze and tegaderm   - dispo per ED   - plan discussed with Dr. Burgess, agrees

## 2025-03-11 NOTE — CONSULT NOTE ADULT - GASTROINTESTINAL COMMENTS
pfannenstiel incision clean and dry, no drainage expressed on palpation, accordion drain in l lateral abdominal wall superior to incision with surrounding induration and tenderness to palpation without erythema or active purulent drainage, accordion drain with serous output. Small excorationa at L lateral aspect of pfannenstiel incision. approx 2 cm excoriation at R buttock clean and dry covered with gauze and Tegaderm.

## 2025-03-11 NOTE — ED ADULT NURSE REASSESSMENT NOTE - NS ED NURSE REASSESS COMMENT FT1
Assumed care of pt from previous RN. Pt resting in bed A&Ox4 c/o pain in abdomen. Pt requesting food and to speak to MD. MD contacted for results and pain medicine. Pt RR even and unlabored, NAD noted. Pt bed in lowest locked position, able to make needs known. Pt aware of plan to be seen by surgery.

## 2025-03-16 LAB
CULTURE RESULTS: SIGNIFICANT CHANGE UP
SPECIMEN SOURCE: SIGNIFICANT CHANGE UP

## 2025-03-20 ENCOUNTER — APPOINTMENT (OUTPATIENT)
Dept: TRAUMA SURGERY | Facility: CLINIC | Age: 60
End: 2025-03-20
Payer: COMMERCIAL

## 2025-03-20 VITALS
TEMPERATURE: 97.9 F | HEIGHT: 62 IN | WEIGHT: 133 LBS | BODY MASS INDEX: 24.48 KG/M2 | DIASTOLIC BLOOD PRESSURE: 84 MMHG | SYSTOLIC BLOOD PRESSURE: 134 MMHG | HEART RATE: 75 BPM | RESPIRATION RATE: 16 BRPM | OXYGEN SATURATION: 98 %

## 2025-03-20 DIAGNOSIS — L03.311 CELLULITIS OF ABDOMINAL WALL: ICD-10-CM

## 2025-03-20 DIAGNOSIS — Z98.890 OTHER SPECIFIED POSTPROCEDURAL STATES: ICD-10-CM

## 2025-03-20 DIAGNOSIS — Z98.82 BREAST IMPLANT STATUS: ICD-10-CM

## 2025-03-20 PROCEDURE — 99204 OFFICE O/P NEW MOD 45 MIN: CPT

## 2025-03-21 PROBLEM — L03.311 CELLULITIS OF ABDOMINAL WALL: Status: ACTIVE | Noted: 2025-03-21

## 2025-03-21 PROBLEM — Z98.82 H/O BREAST AUGMENTATION: Status: ACTIVE | Noted: 2025-03-21

## 2025-03-21 PROBLEM — Z98.890 H/O ABDOMINOPLASTY: Status: ACTIVE | Noted: 2025-03-21
